# Patient Record
Sex: MALE | Race: WHITE | NOT HISPANIC OR LATINO | Employment: UNEMPLOYED | URBAN - METROPOLITAN AREA
[De-identification: names, ages, dates, MRNs, and addresses within clinical notes are randomized per-mention and may not be internally consistent; named-entity substitution may affect disease eponyms.]

---

## 2021-06-11 PROBLEM — C67.9 TRANSITIONAL CELL BLADDER CANCER: Status: ACTIVE | Noted: 2021-06-11

## 2021-07-30 PROBLEM — R91.1 LESION OF LUNG: Status: ACTIVE | Noted: 2021-07-30

## 2021-10-11 PROBLEM — R91.8 LUNG MASS: Status: ACTIVE | Noted: 2021-10-11

## 2021-11-03 PROBLEM — R91.1 LUNG NODULE: Status: ACTIVE | Noted: 2021-10-11

## 2021-11-17 PROBLEM — C67.9: Status: ACTIVE | Noted: 2021-11-17

## 2021-11-18 PROBLEM — C67.9 METASTASIS FROM BLADDER CANCER: Status: ACTIVE | Noted: 2021-11-18

## 2021-11-18 PROBLEM — C79.9 METASTASIS FROM BLADDER CANCER: Status: ACTIVE | Noted: 2021-11-18

## 2021-11-26 PROBLEM — J90 PLEURAL EFFUSION: Status: ACTIVE | Noted: 2021-11-26

## 2021-11-29 PROBLEM — M51.26 DISPLACEMENT OF LUMBAR INTERVERTEBRAL DISC WITHOUT MYELOPATHY: Status: ACTIVE | Noted: 2021-11-29

## 2021-11-29 PROBLEM — G56.00 CARPAL TUNNEL SYNDROME: Status: ACTIVE | Noted: 2021-11-29

## 2021-11-29 PROBLEM — M76.899 ENTHESOPATHY OF HIP REGION: Status: ACTIVE | Noted: 2021-11-29

## 2021-11-29 PROBLEM — G60.3 IDIOPATHIC PROGRESSIVE POLYNEUROPATHY: Status: ACTIVE | Noted: 2021-11-29

## 2021-11-29 PROBLEM — M54.17 LUMBOSACRAL RADICULITIS: Status: ACTIVE | Noted: 2021-11-29

## 2021-11-29 PROBLEM — M54.9 BACKACHE: Status: ACTIVE | Noted: 2021-11-29

## 2021-11-29 PROBLEM — M71.9 DISORDER OF BURSAE OF SHOULDER REGION: Status: ACTIVE | Noted: 2021-11-29

## 2021-11-29 PROBLEM — G89.4 CHRONIC PAIN SYNDROME: Status: ACTIVE | Noted: 2021-11-29

## 2021-11-29 PROBLEM — M25.559 HIP PAIN: Status: ACTIVE | Noted: 2021-11-29

## 2021-11-29 PROBLEM — M19.019 LOCALIZED, PRIMARY OSTEOARTHRITIS OF SHOULDER REGION: Status: ACTIVE | Noted: 2021-11-29

## 2021-11-29 PROBLEM — M47.817 LUMBOSACRAL SPONDYLOSIS WITHOUT MYELOPATHY: Status: ACTIVE | Noted: 2021-11-29

## 2021-11-29 PROBLEM — M48.061 SPINAL STENOSIS OF LUMBAR REGION: Status: ACTIVE | Noted: 2021-11-29

## 2021-11-29 PROBLEM — M47.812 CERVICAL SPONDYLOSIS WITHOUT MYELOPATHY: Status: ACTIVE | Noted: 2021-11-29

## 2022-01-01 ENCOUNTER — NURSE TRIAGE (OUTPATIENT)
Dept: ADMINISTRATIVE | Facility: CLINIC | Age: 62
End: 2022-01-01

## 2022-03-11 PROBLEM — R31.9 HEMATURIA: Status: ACTIVE | Noted: 2022-01-01

## 2022-03-11 PROBLEM — Z79.899 ON ANTINEOPLASTIC CHEMOTHERAPY: Status: ACTIVE | Noted: 2022-01-01

## 2022-03-11 PROBLEM — E87.1 HYPONATREMIA: Status: ACTIVE | Noted: 2022-01-01

## 2022-03-11 PROBLEM — R19.7 DIARRHEA: Status: ACTIVE | Noted: 2022-01-01

## 2022-03-12 PROBLEM — D64.9 ANEMIA: Status: ACTIVE | Noted: 2022-01-01

## 2022-03-13 PROBLEM — K52.9 COLITIS: Status: ACTIVE | Noted: 2022-01-01

## 2022-03-14 PROBLEM — E87.1 HYPONATREMIA: Status: RESOLVED | Noted: 2022-01-01 | Resolved: 2022-01-01

## 2022-09-07 PROBLEM — R39.198 DIFFICULTY URINATING: Status: ACTIVE | Noted: 2022-01-01

## 2022-09-07 PROBLEM — F10.10 ALCOHOL ABUSE: Status: ACTIVE | Noted: 2022-01-01

## 2022-09-07 PROBLEM — R06.02 SHORTNESS OF BREATH: Status: ACTIVE | Noted: 2022-01-01

## 2022-09-07 PROBLEM — R21 RASH: Status: ACTIVE | Noted: 2022-01-01

## 2022-09-08 PROBLEM — C78.02 MALIGNANT NEOPLASM METASTATIC TO BOTH LUNGS: Status: ACTIVE | Noted: 2022-01-01

## 2022-09-08 PROBLEM — C78.01 MALIGNANT NEOPLASM METASTATIC TO BOTH LUNGS: Status: ACTIVE | Noted: 2022-01-01

## 2022-09-08 PROBLEM — M25.473 SWELLING OF ANKLE: Status: ACTIVE | Noted: 2022-01-01

## 2022-09-09 PROBLEM — J43.2 CENTRILOBULAR EMPHYSEMA: Status: ACTIVE | Noted: 2022-01-01

## 2022-09-10 PROBLEM — C79.31 METASTASIS TO BRAIN: Status: ACTIVE | Noted: 2022-01-01

## 2022-09-11 PROBLEM — N30.01 ACUTE CYSTITIS WITH HEMATURIA: Status: ACTIVE | Noted: 2022-01-01

## 2022-09-11 PROBLEM — Z71.89 GOALS OF CARE, COUNSELING/DISCUSSION: Status: ACTIVE | Noted: 2022-01-01

## 2022-09-11 PROBLEM — I26.99 PULMONARY EMBOLUS: Status: ACTIVE | Noted: 2022-01-01

## 2022-09-16 PROBLEM — C80.1 GERM CELL TUMOR: Status: ACTIVE | Noted: 2022-01-01

## 2022-09-16 PROBLEM — I26.99 PULMONARY EMBOLISM: Status: ACTIVE | Noted: 2022-01-01

## 2022-09-16 PROBLEM — D72.829 LEUKOCYTOSIS: Status: ACTIVE | Noted: 2022-01-01

## 2022-09-16 PROBLEM — E03.9 HYPOTHYROIDISM: Status: ACTIVE | Noted: 2022-01-01

## 2022-09-16 PROBLEM — L30.9 DERMATITIS: Status: ACTIVE | Noted: 2022-01-01

## 2022-09-17 PROBLEM — E87.1 HYPONATREMIA: Status: ACTIVE | Noted: 2022-01-01

## 2022-09-17 PROBLEM — E83.51 HYPOCALCEMIA: Status: ACTIVE | Noted: 2022-01-01

## 2022-09-19 NOTE — TELEPHONE ENCOUNTER
Pt stated md told him he couldn't leave the hospital until he got his biopsy results. Pt stated he is not going to do anything about the results so he dont understand why he need to stay in the hospital.  Informed pt that this is the triage line and he needed to speak with his md in the hospital. Pt instructed to use call light button to get assistance from his nurse. Pt verbalized understanding.   Reason for Disposition   General information question, no triage required and triager able to answer question    Protocols used: Information Only Call-A-AH

## 2022-09-30 PROBLEM — G93.89 BRAIN MASS: Status: ACTIVE | Noted: 2022-01-01

## 2022-10-05 PROBLEM — G89.3 CANCER ASSOCIATED PAIN: Status: ACTIVE | Noted: 2022-01-01

## 2022-10-05 PROBLEM — R73.9 HYPERGLYCEMIA: Status: ACTIVE | Noted: 2022-01-01

## 2022-10-06 PROBLEM — R04.2 HEMOPTYSIS: Status: ACTIVE | Noted: 2022-01-01

## 2022-10-06 PROBLEM — I10 PRIMARY HYPERTENSION: Status: ACTIVE | Noted: 2022-01-01

## 2022-10-06 PROBLEM — E87.5 HYPERKALEMIA: Status: ACTIVE | Noted: 2022-01-01

## 2022-10-07 PROBLEM — J96.01 ACUTE HYPOXEMIC RESPIRATORY FAILURE: Status: ACTIVE | Noted: 2022-01-01

## 2022-10-07 PROBLEM — M21.339 WRIST DROP, ACQUIRED: Status: ACTIVE | Noted: 2022-01-01

## 2022-10-07 PROBLEM — E79.0 HYPERURICEMIA: Status: ACTIVE | Noted: 2022-01-01

## 2022-10-09 PROBLEM — E87.5 HYPERKALEMIA: Status: RESOLVED | Noted: 2022-01-01 | Resolved: 2022-01-01

## 2022-10-09 PROBLEM — E79.0 HYPERURICEMIA: Status: RESOLVED | Noted: 2022-01-01 | Resolved: 2022-01-01

## 2022-10-10 PROBLEM — J18.9 PNEUMONIA: Status: ACTIVE | Noted: 2022-01-01

## 2022-10-10 PROBLEM — Z66 DNR (DO NOT RESUSCITATE): Status: ACTIVE | Noted: 2022-01-01

## 2022-10-11 PROBLEM — J18.9 PNEUMONIA: Status: RESOLVED | Noted: 2022-01-01 | Resolved: 2022-01-01

## 2022-10-13 PROBLEM — J96.01 ACUTE HYPOXEMIC RESPIRATORY FAILURE: Status: RESOLVED | Noted: 2022-01-01 | Resolved: 2022-01-01

## 2022-10-13 PROBLEM — R73.9 HYPERGLYCEMIA: Status: RESOLVED | Noted: 2022-01-01 | Resolved: 2022-01-01

## 2022-12-12 PROBLEM — I26.99 PULMONARY EMBOLUS: Status: RESOLVED | Noted: 2022-01-01 | Resolved: 2022-12-12

## 2022-12-19 PROBLEM — I26.99 PULMONARY EMBOLISM: Status: RESOLVED | Noted: 2022-01-01 | Resolved: 2022-12-19
